# Patient Record
Sex: FEMALE
[De-identification: names, ages, dates, MRNs, and addresses within clinical notes are randomized per-mention and may not be internally consistent; named-entity substitution may affect disease eponyms.]

---

## 2024-03-04 ENCOUNTER — APPOINTMENT (OUTPATIENT)
Dept: OBGYN | Facility: CLINIC | Age: 39
End: 2024-03-04
Payer: COMMERCIAL

## 2024-03-04 VITALS
SYSTOLIC BLOOD PRESSURE: 127 MMHG | BODY MASS INDEX: 35 KG/M2 | DIASTOLIC BLOOD PRESSURE: 87 MMHG | HEIGHT: 71 IN | WEIGHT: 250 LBS

## 2024-03-04 DIAGNOSIS — Z81.3 FAMILY HISTORY OF OTHER PSYCHOACTIVE SUBSTANCE ABUSE AND DEPENDENCE: ICD-10-CM

## 2024-03-04 DIAGNOSIS — Z11.3 ENCOUNTER FOR SCREENING FOR INFECTIONS WITH A PREDOMINANTLY SEXUAL MODE OF TRANSMISSION: ICD-10-CM

## 2024-03-04 DIAGNOSIS — F17.210 NICOTINE DEPENDENCE, CIGARETTES, UNCOMPLICATED: ICD-10-CM

## 2024-03-04 DIAGNOSIS — Z01.419 ENCOUNTER FOR GYNECOLOGICAL EXAMINATION (GENERAL) (ROUTINE) W/OUT ABNORMAL FINDINGS: ICD-10-CM

## 2024-03-04 DIAGNOSIS — Z86.2 PERSONAL HISTORY OF DISEASES OF THE BLOOD AND BLOOD-FORMING ORGANS AND CERTAIN DISORDERS INVOLVING THE IMMUNE MECHANISM: ICD-10-CM

## 2024-03-04 DIAGNOSIS — Z78.9 OTHER SPECIFIED HEALTH STATUS: ICD-10-CM

## 2024-03-04 DIAGNOSIS — J45.20 MILD INTERMITTENT ASTHMA, UNCOMPLICATED: ICD-10-CM

## 2024-03-04 DIAGNOSIS — Z91.89 OTHER SPECIFIED PERSONAL RISK FACTORS, NOT ELSEWHERE CLASSIFIED: ICD-10-CM

## 2024-03-04 PROBLEM — Z00.00 ENCOUNTER FOR PREVENTIVE HEALTH EXAMINATION: Status: ACTIVE | Noted: 2024-03-04

## 2024-03-04 LAB
HCG UR QL: NEGATIVE
QUALITY CONTROL: YES

## 2024-03-04 PROCEDURE — 81025 URINE PREGNANCY TEST: CPT

## 2024-03-04 PROCEDURE — 99385 PREV VISIT NEW AGE 18-39: CPT

## 2024-03-04 NOTE — REVIEW OF SYSTEMS
[Pelvic pain] : pelvic pain [Patient Intake Form Reviewed] : Patient intake form was reviewed [FreeTextEntry8] : menorrhagia, dysmenorrhea

## 2024-03-04 NOTE — PLAN
[FreeTextEntry1] : Unremarkable CBE  SBE reviewed Pap and HPV collected wtih GC/CT Tenderness of cervix, uterus on exam TVUS ordered with follow up Mammogram ordered secondary to family hx Discussed use of hormonal contraception, possibly Mirena to manage dysmenorrhea, menorrhagia Healthy diet, exercise, sleep hygiene discussed No other gyn concerns today RTO x 1 year or prn

## 2024-03-04 NOTE — HISTORY OF PRESENT ILLNESS
[N] : Patient is not sexually active [Patient reported mammogram was normal] : Patient reported mammogram was normal [Menses] : menses [Diffuse] : diffuse [Rand] : intercourse [Bowel Movement] : bowel movement [TextBox_4] : 37 yo  for well woman exam.  NO hx abnormal paps. NOt sexually active. MGM and 3 of her sisters with breast cancer.  Mother does not get mammograms.  History of painful heavy menses since birth of last child, pain with intercourse and bowel  movements. [Mammogramdate] : 2020 [LMPDate] : 2/14/23 [PGHxTotal] : 5 [[facility] : 3 [[facility]] : 3 [Urination] : not urination

## 2024-03-04 NOTE — PHYSICAL EXAM
[Appropriately responsive] : appropriately responsive [Alert] : alert [No Acute Distress] : no acute distress [Regular Rate Rhythm] : regular rate rhythm [No Lymphadenopathy] : no lymphadenopathy [No Murmurs] : no murmurs [Soft] : soft [Clear to Auscultation B/L] : clear to auscultation bilaterally [Non-tender] : non-tender [Non-distended] : non-distended [No HSM] : No HSM [No Mass] : no mass [No Lesions] : no lesions [Oriented x3] : oriented x3 [Examination Of The Breasts] : a normal appearance [No Masses] : no breast masses were palpable [Labia Majora] : normal [Labia Minora] : normal [Normal] : normal [Uterine Adnexae] : normal [Tenderness] : tender

## 2024-03-05 LAB
C TRACH RRNA SPEC QL NAA+PROBE: NOT DETECTED
HPV HIGH+LOW RISK DNA PNL CVX: NOT DETECTED
N GONORRHOEA RRNA SPEC QL NAA+PROBE: NOT DETECTED
SOURCE TP AMPLIFICATION: NORMAL

## 2024-03-07 LAB — CYTOLOGY CVX/VAG DOC THIN PREP: NORMAL

## 2024-03-19 ENCOUNTER — APPOINTMENT (OUTPATIENT)
Dept: ANTEPARTUM | Facility: CLINIC | Age: 39
End: 2024-03-19
Payer: COMMERCIAL

## 2024-03-19 ENCOUNTER — ASOB RESULT (OUTPATIENT)
Age: 39
End: 2024-03-19

## 2024-03-19 ENCOUNTER — APPOINTMENT (OUTPATIENT)
Dept: OBGYN | Facility: CLINIC | Age: 39
End: 2024-03-19
Payer: COMMERCIAL

## 2024-03-19 VITALS
SYSTOLIC BLOOD PRESSURE: 113 MMHG | WEIGHT: 250 LBS | BODY MASS INDEX: 35 KG/M2 | HEIGHT: 71 IN | DIASTOLIC BLOOD PRESSURE: 78 MMHG

## 2024-03-19 DIAGNOSIS — R10.2 PELVIC AND PERINEAL PAIN: ICD-10-CM

## 2024-03-19 PROCEDURE — 76830 TRANSVAGINAL US NON-OB: CPT

## 2024-03-19 PROCEDURE — 99213 OFFICE O/P EST LOW 20 MIN: CPT

## 2024-03-19 PROCEDURE — 76856 US EXAM PELVIC COMPLETE: CPT | Mod: 59

## 2024-03-19 NOTE — HISTORY OF PRESENT ILLNESS
[FreeTextEntry1] : 37 yo  for follow up of TVUS ordered to evaluate history of painful heavy menses since birth of last child, pain with intercourse and bowel movements.   cervical/uterine tenderness on annual exam.  Previously discussed use of hormonal contraception, possibly Mirena to manage dysmenorrhea, menorrhagia .  TVUS today: EML: 6 left ovary WNL, right ovary not visualized no adnexal masses mild free fluid Myomas x 3: Anterior subserosal 3.3 x 3 x 2.6 Anterior left lateral intramural and submucousal 3.4  x 3.7 x 3.1 Right lateral subserosal 4.5 x 4.4 x 4.5

## 2024-03-19 NOTE — PLAN
[FreeTextEntry1] : I reviewed finding of myomas x three.  Endometriosis cannot be effectively diagnosed on ultrasound and gold standard for dx is laparoscopy.  Referred for medical consult to review medical, surgical, hormonal options for management of heavy menstrual bleeding, possible endometriosis and large fibroids.    Patient verbalizes understanding of and agreement with this plan.  All questions answered to patient's satisfaction.

## 2024-04-01 ENCOUNTER — APPOINTMENT (OUTPATIENT)
Dept: ANTEPARTUM | Facility: CLINIC | Age: 39
End: 2024-04-01

## 2024-04-16 ENCOUNTER — APPOINTMENT (OUTPATIENT)
Dept: OBGYN | Facility: CLINIC | Age: 39
End: 2024-04-16

## 2024-04-19 ENCOUNTER — APPOINTMENT (OUTPATIENT)
Dept: OBGYN | Facility: CLINIC | Age: 39
End: 2024-04-19